# Patient Record
Sex: FEMALE | URBAN - METROPOLITAN AREA
[De-identification: names, ages, dates, MRNs, and addresses within clinical notes are randomized per-mention and may not be internally consistent; named-entity substitution may affect disease eponyms.]

---

## 2017-04-06 ENCOUNTER — INPATIENT (INPATIENT)
Facility: HOSPITAL | Age: 52
LOS: 2 days | Discharge: HOME | End: 2017-04-09
Attending: INTERNAL MEDICINE | Admitting: INTERNAL MEDICINE
Payer: MEDICAID

## 2017-04-06 VITALS
DIASTOLIC BLOOD PRESSURE: 102 MMHG | OXYGEN SATURATION: 100 % | HEART RATE: 90 BPM | SYSTOLIC BLOOD PRESSURE: 178 MMHG | RESPIRATION RATE: 25 BRPM | TEMPERATURE: 98 F

## 2017-04-06 DIAGNOSIS — Z98.89 OTHER SPECIFIED POSTPROCEDURAL STATES: Chronic | ICD-10-CM

## 2017-04-06 NOTE — ED ADULT TRIAGE NOTE - CHIEF COMPLAINT QUOTE
Pt c/o SOB that began today. Comes on intermittently and pt becomes tachypneic and feels palpitations. Denies SOB. Pt is hyperventilating and crying in triage. Denies any PMH. Pt c/o SOB that began today. Comes on intermittently and pt becomes tachypneic and feels palpitations. Denies SOB. Pt is hyperventilating and crying in triage. Pt is speaking in full sentences with intermittent episodes of tachypnea. O2 sat 100% on RA. Denies any PMH.

## 2017-04-07 DIAGNOSIS — R55 SYNCOPE AND COLLAPSE: ICD-10-CM

## 2017-04-07 DIAGNOSIS — R09.81 NASAL CONGESTION: ICD-10-CM

## 2017-04-07 DIAGNOSIS — R06.02 SHORTNESS OF BREATH: ICD-10-CM

## 2017-04-07 LAB
ALBUMIN SERPL ELPH-MCNC: 4.3 G/DL — SIGNIFICANT CHANGE UP (ref 3.3–5)
ALP SERPL-CCNC: 48 U/L — SIGNIFICANT CHANGE UP (ref 40–120)
ALT FLD-CCNC: 15 U/L — SIGNIFICANT CHANGE UP (ref 4–33)
APPEARANCE UR: CLEAR — SIGNIFICANT CHANGE UP
APTT BLD: 30.1 SEC — SIGNIFICANT CHANGE UP (ref 27.5–37.4)
AST SERPL-CCNC: 21 U/L — SIGNIFICANT CHANGE UP (ref 4–32)
BASOPHILS # BLD AUTO: 0.02 K/UL — SIGNIFICANT CHANGE UP (ref 0–0.2)
BASOPHILS NFR BLD AUTO: 0.3 % — SIGNIFICANT CHANGE UP (ref 0–2)
BILIRUB SERPL-MCNC: 0.3 MG/DL — SIGNIFICANT CHANGE UP (ref 0.2–1.2)
BILIRUB UR-MCNC: NEGATIVE — SIGNIFICANT CHANGE UP
BLOOD UR QL VISUAL: NEGATIVE — SIGNIFICANT CHANGE UP
BUN SERPL-MCNC: 18 MG/DL — SIGNIFICANT CHANGE UP (ref 7–23)
CALCIUM SERPL-MCNC: 9.4 MG/DL — SIGNIFICANT CHANGE UP (ref 8.4–10.5)
CHLORIDE SERPL-SCNC: 102 MMOL/L — SIGNIFICANT CHANGE UP (ref 98–107)
CK MB BLD-MCNC: 1 NG/ML — SIGNIFICANT CHANGE UP (ref 1–4.7)
CK MB BLD-MCNC: 1.02 NG/ML — SIGNIFICANT CHANGE UP (ref 1–4.7)
CK MB BLD-MCNC: SIGNIFICANT CHANGE UP (ref 0–2.5)
CK SERPL-CCNC: 61 U/L — SIGNIFICANT CHANGE UP (ref 25–170)
CK SERPL-CCNC: 78 U/L — SIGNIFICANT CHANGE UP (ref 25–170)
CO2 SERPL-SCNC: 21 MMOL/L — LOW (ref 22–31)
COLOR SPEC: SIGNIFICANT CHANGE UP
CREAT SERPL-MCNC: 0.84 MG/DL — SIGNIFICANT CHANGE UP (ref 0.5–1.3)
EOSINOPHIL # BLD AUTO: 0.07 K/UL — SIGNIFICANT CHANGE UP (ref 0–0.5)
EOSINOPHIL NFR BLD AUTO: 1.2 % — SIGNIFICANT CHANGE UP (ref 0–6)
GLUCOSE SERPL-MCNC: 111 MG/DL — HIGH (ref 70–99)
GLUCOSE UR-MCNC: NEGATIVE — SIGNIFICANT CHANGE UP
HBA1C BLD-MCNC: 5.4 % — SIGNIFICANT CHANGE UP (ref 4–5.6)
HCG UR-SCNC: NEGATIVE — SIGNIFICANT CHANGE UP
HCT VFR BLD CALC: 34.3 % — LOW (ref 34.5–45)
HGB BLD-MCNC: 11.5 G/DL — SIGNIFICANT CHANGE UP (ref 11.5–15.5)
IMM GRANULOCYTES NFR BLD AUTO: 0.2 % — SIGNIFICANT CHANGE UP (ref 0–1.5)
INR BLD: 1.04 — SIGNIFICANT CHANGE UP (ref 0.88–1.17)
KETONES UR-MCNC: SIGNIFICANT CHANGE UP
LEUKOCYTE ESTERASE UR-ACNC: NEGATIVE — SIGNIFICANT CHANGE UP
LYMPHOCYTES # BLD AUTO: 2.03 K/UL — SIGNIFICANT CHANGE UP (ref 1–3.3)
LYMPHOCYTES # BLD AUTO: 34.3 % — SIGNIFICANT CHANGE UP (ref 13–44)
MAGNESIUM SERPL-MCNC: 1.9 MG/DL — SIGNIFICANT CHANGE UP (ref 1.6–2.6)
MCHC RBC-ENTMCNC: 27.5 PG — SIGNIFICANT CHANGE UP (ref 27–34)
MCHC RBC-ENTMCNC: 33.5 % — SIGNIFICANT CHANGE UP (ref 32–36)
MCV RBC AUTO: 82.1 FL — SIGNIFICANT CHANGE UP (ref 80–100)
MONOCYTES # BLD AUTO: 0.6 K/UL — SIGNIFICANT CHANGE UP (ref 0–0.9)
MONOCYTES NFR BLD AUTO: 10.1 % — SIGNIFICANT CHANGE UP (ref 2–14)
MUCOUS THREADS # UR AUTO: SIGNIFICANT CHANGE UP
NEUTROPHILS # BLD AUTO: 3.19 K/UL — SIGNIFICANT CHANGE UP (ref 1.8–7.4)
NEUTROPHILS NFR BLD AUTO: 53.9 % — SIGNIFICANT CHANGE UP (ref 43–77)
NITRITE UR-MCNC: NEGATIVE — SIGNIFICANT CHANGE UP
NT-PROBNP SERPL-SCNC: 367.2 PG/ML — SIGNIFICANT CHANGE UP
PH UR: 7 — SIGNIFICANT CHANGE UP (ref 4.6–8)
PLATELET # BLD AUTO: 282 K/UL — SIGNIFICANT CHANGE UP (ref 150–400)
PMV BLD: 10.1 FL — SIGNIFICANT CHANGE UP (ref 7–13)
POTASSIUM SERPL-MCNC: 3.9 MMOL/L — SIGNIFICANT CHANGE UP (ref 3.5–5.3)
POTASSIUM SERPL-SCNC: 3.9 MMOL/L — SIGNIFICANT CHANGE UP (ref 3.5–5.3)
PROT SERPL-MCNC: 7.1 G/DL — SIGNIFICANT CHANGE UP (ref 6–8.3)
PROT UR-MCNC: NEGATIVE — SIGNIFICANT CHANGE UP
PROTHROM AB SERPL-ACNC: 11.7 SEC — SIGNIFICANT CHANGE UP (ref 9.8–13.1)
RBC # BLD: 4.18 M/UL — SIGNIFICANT CHANGE UP (ref 3.8–5.2)
RBC # FLD: 13.2 % — SIGNIFICANT CHANGE UP (ref 10.3–14.5)
RBC CASTS # UR COMP ASSIST: SIGNIFICANT CHANGE UP (ref 0–?)
SODIUM SERPL-SCNC: 141 MMOL/L — SIGNIFICANT CHANGE UP (ref 135–145)
SP GR SPEC: 1.01 — SIGNIFICANT CHANGE UP (ref 1–1.03)
SP GR UR: 1.01 — SIGNIFICANT CHANGE UP (ref 1–1.03)
SQUAMOUS # UR AUTO: SIGNIFICANT CHANGE UP
TROPONIN T SERPL-MCNC: < 0.06 NG/ML — SIGNIFICANT CHANGE UP (ref 0–0.06)
TROPONIN T SERPL-MCNC: < 0.06 NG/ML — SIGNIFICANT CHANGE UP (ref 0–0.06)
TSH SERPL-MCNC: 2.39 UIU/ML — SIGNIFICANT CHANGE UP (ref 0.27–4.2)
UROBILINOGEN FLD QL: NORMAL E.U. — SIGNIFICANT CHANGE UP (ref 0.1–0.2)
WBC # BLD: 5.92 K/UL — SIGNIFICANT CHANGE UP (ref 3.8–10.5)
WBC # FLD AUTO: 5.92 K/UL — SIGNIFICANT CHANGE UP (ref 3.8–10.5)
WBC UR QL: SIGNIFICANT CHANGE UP (ref 0–?)

## 2017-04-07 PROCEDURE — 70486 CT MAXILLOFACIAL W/O DYE: CPT | Mod: 26

## 2017-04-07 PROCEDURE — 71020: CPT | Mod: 26

## 2017-04-07 PROCEDURE — 70450 CT HEAD/BRAIN W/O DYE: CPT | Mod: 26

## 2017-04-07 RX ORDER — FLUTICASONE PROPIONATE 50 MCG
1 SPRAY, SUSPENSION NASAL
Qty: 0 | Refills: 0 | COMMUNITY

## 2017-04-07 RX ORDER — ENOXAPARIN SODIUM 100 MG/ML
40 INJECTION SUBCUTANEOUS EVERY 24 HOURS
Qty: 0 | Refills: 0 | Status: DISCONTINUED | OUTPATIENT
Start: 2017-04-07 | End: 2017-04-09

## 2017-04-07 RX ORDER — SODIUM CHLORIDE 9 MG/ML
3 INJECTION INTRAMUSCULAR; INTRAVENOUS; SUBCUTANEOUS EVERY 8 HOURS
Qty: 0 | Refills: 0 | Status: DISCONTINUED | OUTPATIENT
Start: 2017-04-07 | End: 2017-04-09

## 2017-04-07 RX ORDER — FLUTICASONE PROPIONATE 50 MCG
1 SPRAY, SUSPENSION NASAL DAILY
Qty: 0 | Refills: 0 | Status: DISCONTINUED | OUTPATIENT
Start: 2017-04-07 | End: 2017-04-09

## 2017-04-07 RX ORDER — ACETAMINOPHEN 500 MG
650 TABLET ORAL EVERY 6 HOURS
Qty: 0 | Refills: 0 | Status: DISCONTINUED | OUTPATIENT
Start: 2017-04-07 | End: 2017-04-09

## 2017-04-07 RX ORDER — ASPIRIN/CALCIUM CARB/MAGNESIUM 324 MG
162 TABLET ORAL ONCE
Qty: 0 | Refills: 0 | Status: COMPLETED | OUTPATIENT
Start: 2017-04-07 | End: 2017-04-07

## 2017-04-07 RX ADMIN — Medication 650 MILLIGRAM(S): at 17:30

## 2017-04-07 RX ADMIN — SODIUM CHLORIDE 3 MILLILITER(S): 9 INJECTION INTRAMUSCULAR; INTRAVENOUS; SUBCUTANEOUS at 22:11

## 2017-04-07 RX ADMIN — SODIUM CHLORIDE 3 MILLILITER(S): 9 INJECTION INTRAMUSCULAR; INTRAVENOUS; SUBCUTANEOUS at 17:28

## 2017-04-07 RX ADMIN — ENOXAPARIN SODIUM 40 MILLIGRAM(S): 100 INJECTION SUBCUTANEOUS at 17:29

## 2017-04-07 RX ADMIN — Medication 162 MILLIGRAM(S): at 03:37

## 2017-04-07 RX ADMIN — Medication 650 MILLIGRAM(S): at 18:00

## 2017-04-07 NOTE — H&P ADULT. - NEGATIVE OPHTHALMOLOGIC SYMPTOMS
no loss of vision L/no loss of vision R/no blurred vision L/no photophobia/no blurred vision R/no diplopia

## 2017-04-07 NOTE — H&P ADULT. - FAMILY HISTORY
Father  Still living? Yes, Estimated age: Age Unknown  Family history of hypertension, Age at diagnosis: Age Unknown     Mother  Still living? Yes, Estimated age: Age Unknown  Family history of hypertension, Age at diagnosis: Age Unknown

## 2017-04-07 NOTE — ED PROVIDER NOTE - MEDICAL DECISION MAKING DETAILS
pt with likely a panic attack with abnml ekg, unlikely cardiac based on no CP or diaphoresis, non exertional. No PE risk factors. Will check cxr, cardiacs x 2

## 2017-04-07 NOTE — ED ADULT NURSE NOTE - OBJECTIVE STATEMENT
Pt presents to ED R#15 AOx4, in NAD, c/o episode of SOB with B/L arm parasthesias. pt states she believed she had panic attack, currently denies SOB, but states "I don't feel like I can take full breath of air." Respirations appear even and unlabored at present time, O2 sat 100% on RA,  Pt reports taking steroids today for possible sinus infection, has been taking afrin nasal spray x1 month for nasal congestion. Denies fevers/ chills. Denies N/ V/ D/ CP/ urinary changes. Dr Haji at bedside, VSS, will continue to monitor.

## 2017-04-07 NOTE — ED PROVIDER NOTE - PROGRESS NOTE DETAILS
K[place]: Currently asymptomatic. Repeat EKG wnl. Given initial STD w/ SOB and now normal EKG w/o symptoms, though story s very atypical, higher concern for ACS. Admitting for further cardiac w/u. Updated pt, Has no cardiologist. d/w dr españa, text paged tele pa.

## 2017-04-07 NOTE — H&P ADULT. - ASSESSMENT
52 y/o female, with a PmHx of Appendectomy, is being admitted to telemetry for sob and near syncope.

## 2017-04-07 NOTE — ED PROVIDER NOTE - OBJECTIVE STATEMENT
52yo f w/o pmh p/w shortness of breath. Pt with nasal congestion for past few weeks, thought she had a sinus infection, used afrin multiple times a day for past two weeks. Pmd started Flonase today. Pt used flonase 6x since 6pm and started having SOB and anxiety starting around 9pm. Symptoms accompanied by tingling in hand. Symptoms now improved. Denies CP or palpitations during this period. No known hx of panic attacks. +nasal congestion. No cough, fevers, chills, abdominal pain, urinary symptoms. Denies recent plane rides, estrogen use, hx of blood clots, cancer hx. 50yo f w/o pmh p/w shortness of breath. Pt with nasal congestion for past few weeks, thought she had a sinus infection, used afrin multiple times a day for past two weeks. Pmd started Flonase today. Pt used flonase 6x since 6pm and started having SOB and anxiety starting around 9pm. Symptoms accompanied by tingling in hand. Symptoms now improved. Denies CP or palpitations during this period. No known hx of panic attacks. +nasal congestion. No cough, fevers, chills, abdominal pain, urinary symptoms. Denies recent plane rides, estrogen use, hx of blood clots, cancer hx, LE pain/swelling. No FMH CAD/clots. No prior cardiac w/u.

## 2017-04-07 NOTE — H&P ADULT. - ATTENDING COMMENTS
Pt seen and examined agree with plana above   EKG - NSR     A/P     1) Pres syncope - check orthostatics, CT head, 2d echo, likely vasovagal    2) Headache - h/o migranes will get CT head     3) DVT - sc lovenox

## 2017-04-07 NOTE — ED ADULT NURSE NOTE - CHIEF COMPLAINT QUOTE
Pt c/o SOB that began today. Comes on intermittently and pt becomes tachypneic and feels palpitations. Denies SOB. Pt is hyperventilating and crying in triage. Pt is speaking in full sentences with intermittent episodes of tachypnea. O2 sat 100% on RA. Denies any PMH.

## 2017-04-07 NOTE — H&P ADULT. - RS GEN PE MLT RESP DETAILS PC
no chest wall tenderness/good air movement/clear to auscultation bilaterally/respirations non-labored/breath sounds equal/airway patent

## 2017-04-07 NOTE — H&P ADULT. - NEGATIVE CARDIOVASCULAR SYMPTOMS
no chest pain/no palpitations/no dyspnea on exertion/no orthopnea/no peripheral edema/no paroxysmal nocturnal dyspnea

## 2017-04-07 NOTE — H&P ADULT. - HISTORY OF PRESENT ILLNESS
52 y/o female, with a PmHx of Appendectomy, presented to [facility] ED c/o sob and nasal congestion. Pt states for the past 2 weeks she has been having a lot of nasal congestion and thought she had a sinus infection so she has been using Afrin multiple times a day to try and relieve the symptoms. Then yesterday morning at around 0700hrs she woke up from sleep with a severe HA and stayed in bed for most of the day until she was able to go to her PMD's office and was told she might have a polyp in her left nostril and was prescribed Flonase. She states she was feeling better after that but later that evening at around 2100hrs she states the HA came back and she was feeling congested again so she took several more sprays of the Flonase/Afrin to see if that would help. A few minutes later she states she became very dizzy and felt like she was going to pass out so she came to the ED for an evaluation. She had associated symptoms of "shaking", dizziness, emesis x 3 and felt warm (but never took her temperature). Denies chest pain, blurred vision, sick contacts, recent travel, abdominal pain. She states she feels a little better now and was admitted to telemetry for near syncope.

## 2017-04-07 NOTE — ED PROVIDER NOTE - NS ED MD EKG INTERPRETATION 1
normal sinus rhythm, Normal axis, Normal RI interval and QRS complex. There are no acute ischemic ST or T-wave changes.

## 2017-04-07 NOTE — ED PROVIDER NOTE - ATTENDING CONTRIBUTION TO CARE
51F no PMH p/w unable to breath through nose x2w (pmd referred to ED). Vitals, exam wnl. EKG abnormal w/ no old.   ddx: Likely anxiety related. Clinically not PE, dissection, tamponade, PTX, myocarditis, perf. Would be very atypical for ACS. HEART score low - only abnormal EKG.   CBC, cmp, CE, CXR. Repeat CE/EKG.

## 2017-04-08 LAB
BUN SERPL-MCNC: 13 MG/DL — SIGNIFICANT CHANGE UP (ref 7–23)
CALCIUM SERPL-MCNC: 9.1 MG/DL — SIGNIFICANT CHANGE UP (ref 8.4–10.5)
CHLORIDE SERPL-SCNC: 105 MMOL/L — SIGNIFICANT CHANGE UP (ref 98–107)
CHOLEST SERPL-MCNC: 235 MG/DL — HIGH (ref 120–199)
CO2 SERPL-SCNC: 21 MMOL/L — LOW (ref 22–31)
CREAT SERPL-MCNC: 0.71 MG/DL — SIGNIFICANT CHANGE UP (ref 0.5–1.3)
GLUCOSE SERPL-MCNC: 85 MG/DL — SIGNIFICANT CHANGE UP (ref 70–99)
HCT VFR BLD CALC: 33 % — LOW (ref 34.5–45)
HDLC SERPL-MCNC: 50 MG/DL — SIGNIFICANT CHANGE UP (ref 45–65)
HGB BLD-MCNC: 11 G/DL — LOW (ref 11.5–15.5)
LIPID PNL WITH DIRECT LDL SERPL: 169 MG/DL — SIGNIFICANT CHANGE UP
MCHC RBC-ENTMCNC: 27.6 PG — SIGNIFICANT CHANGE UP (ref 27–34)
MCHC RBC-ENTMCNC: 33.3 % — SIGNIFICANT CHANGE UP (ref 32–36)
MCV RBC AUTO: 82.7 FL — SIGNIFICANT CHANGE UP (ref 80–100)
PLATELET # BLD AUTO: 252 K/UL — SIGNIFICANT CHANGE UP (ref 150–400)
PMV BLD: 10.2 FL — SIGNIFICANT CHANGE UP (ref 7–13)
POTASSIUM SERPL-MCNC: 3.5 MMOL/L — SIGNIFICANT CHANGE UP (ref 3.5–5.3)
POTASSIUM SERPL-SCNC: 3.5 MMOL/L — SIGNIFICANT CHANGE UP (ref 3.5–5.3)
RBC # BLD: 3.99 M/UL — SIGNIFICANT CHANGE UP (ref 3.8–5.2)
RBC # FLD: 13.6 % — SIGNIFICANT CHANGE UP (ref 10.3–14.5)
SODIUM SERPL-SCNC: 142 MMOL/L — SIGNIFICANT CHANGE UP (ref 135–145)
TRIGL SERPL-MCNC: 93 MG/DL — SIGNIFICANT CHANGE UP (ref 10–149)
WBC # BLD: 4.48 K/UL — SIGNIFICANT CHANGE UP (ref 3.8–10.5)
WBC # FLD AUTO: 4.48 K/UL — SIGNIFICANT CHANGE UP (ref 3.8–10.5)

## 2017-04-08 PROCEDURE — 93306 TTE W/DOPPLER COMPLETE: CPT | Mod: 26

## 2017-04-08 RX ADMIN — Medication 1 SPRAY(S): at 12:32

## 2017-04-08 RX ADMIN — Medication 650 MILLIGRAM(S): at 15:45

## 2017-04-08 RX ADMIN — SODIUM CHLORIDE 3 MILLILITER(S): 9 INJECTION INTRAMUSCULAR; INTRAVENOUS; SUBCUTANEOUS at 21:30

## 2017-04-08 RX ADMIN — Medication 650 MILLIGRAM(S): at 15:15

## 2017-04-08 RX ADMIN — SODIUM CHLORIDE 3 MILLILITER(S): 9 INJECTION INTRAMUSCULAR; INTRAVENOUS; SUBCUTANEOUS at 14:48

## 2017-04-08 RX ADMIN — SODIUM CHLORIDE 3 MILLILITER(S): 9 INJECTION INTRAMUSCULAR; INTRAVENOUS; SUBCUTANEOUS at 06:56

## 2017-04-08 RX ADMIN — ENOXAPARIN SODIUM 40 MILLIGRAM(S): 100 INJECTION SUBCUTANEOUS at 17:30

## 2017-04-09 ENCOUNTER — TRANSCRIPTION ENCOUNTER (OUTPATIENT)
Age: 52
End: 2017-04-09

## 2017-04-09 VITALS
SYSTOLIC BLOOD PRESSURE: 127 MMHG | DIASTOLIC BLOOD PRESSURE: 83 MMHG | OXYGEN SATURATION: 99 % | HEART RATE: 68 BPM | RESPIRATION RATE: 18 BRPM | TEMPERATURE: 99 F

## 2017-04-09 LAB
BASOPHILS # BLD AUTO: 0.02 K/UL — SIGNIFICANT CHANGE UP (ref 0–0.2)
BASOPHILS NFR BLD AUTO: 0.5 % — SIGNIFICANT CHANGE UP (ref 0–2)
BUN SERPL-MCNC: 14 MG/DL — SIGNIFICANT CHANGE UP (ref 7–23)
CALCIUM SERPL-MCNC: 9.2 MG/DL — SIGNIFICANT CHANGE UP (ref 8.4–10.5)
CHLORIDE SERPL-SCNC: 104 MMOL/L — SIGNIFICANT CHANGE UP (ref 98–107)
CO2 SERPL-SCNC: 23 MMOL/L — SIGNIFICANT CHANGE UP (ref 22–31)
CREAT SERPL-MCNC: 0.74 MG/DL — SIGNIFICANT CHANGE UP (ref 0.5–1.3)
EOSINOPHIL # BLD AUTO: 0.13 K/UL — SIGNIFICANT CHANGE UP (ref 0–0.5)
EOSINOPHIL NFR BLD AUTO: 3.4 % — SIGNIFICANT CHANGE UP (ref 0–6)
GLUCOSE SERPL-MCNC: 96 MG/DL — SIGNIFICANT CHANGE UP (ref 70–99)
HCT VFR BLD CALC: 33.3 % — LOW (ref 34.5–45)
HGB BLD-MCNC: 11.2 G/DL — LOW (ref 11.5–15.5)
IMM GRANULOCYTES NFR BLD AUTO: 0 % — SIGNIFICANT CHANGE UP (ref 0–1.5)
LYMPHOCYTES # BLD AUTO: 1.42 K/UL — SIGNIFICANT CHANGE UP (ref 1–3.3)
LYMPHOCYTES # BLD AUTO: 37.1 % — SIGNIFICANT CHANGE UP (ref 13–44)
MAGNESIUM SERPL-MCNC: 1.9 MG/DL — SIGNIFICANT CHANGE UP (ref 1.6–2.6)
MCHC RBC-ENTMCNC: 28 PG — SIGNIFICANT CHANGE UP (ref 27–34)
MCHC RBC-ENTMCNC: 33.6 % — SIGNIFICANT CHANGE UP (ref 32–36)
MCV RBC AUTO: 83.3 FL — SIGNIFICANT CHANGE UP (ref 80–100)
MONOCYTES # BLD AUTO: 0.34 K/UL — SIGNIFICANT CHANGE UP (ref 0–0.9)
MONOCYTES NFR BLD AUTO: 8.9 % — SIGNIFICANT CHANGE UP (ref 2–14)
NEUTROPHILS # BLD AUTO: 1.92 K/UL — SIGNIFICANT CHANGE UP (ref 1.8–7.4)
NEUTROPHILS NFR BLD AUTO: 50.1 % — SIGNIFICANT CHANGE UP (ref 43–77)
PLATELET # BLD AUTO: 247 K/UL — SIGNIFICANT CHANGE UP (ref 150–400)
PMV BLD: 9.9 FL — SIGNIFICANT CHANGE UP (ref 7–13)
POTASSIUM SERPL-MCNC: 3.8 MMOL/L — SIGNIFICANT CHANGE UP (ref 3.5–5.3)
POTASSIUM SERPL-SCNC: 3.8 MMOL/L — SIGNIFICANT CHANGE UP (ref 3.5–5.3)
RBC # BLD: 4 M/UL — SIGNIFICANT CHANGE UP (ref 3.8–5.2)
RBC # FLD: 13.5 % — SIGNIFICANT CHANGE UP (ref 10.3–14.5)
SODIUM SERPL-SCNC: 141 MMOL/L — SIGNIFICANT CHANGE UP (ref 135–145)
WBC # BLD: 3.83 K/UL — SIGNIFICANT CHANGE UP (ref 3.8–10.5)
WBC # FLD AUTO: 3.83 K/UL — SIGNIFICANT CHANGE UP (ref 3.8–10.5)

## 2017-04-09 RX ADMIN — SODIUM CHLORIDE 3 MILLILITER(S): 9 INJECTION INTRAMUSCULAR; INTRAVENOUS; SUBCUTANEOUS at 14:54

## 2017-04-09 RX ADMIN — SODIUM CHLORIDE 3 MILLILITER(S): 9 INJECTION INTRAMUSCULAR; INTRAVENOUS; SUBCUTANEOUS at 06:34

## 2017-04-09 NOTE — DISCHARGE NOTE ADULT - PLAN OF CARE
Prevent further Events Follow up with Dr. Oglesby in 1- 2 Follow up with Your Dentist in 1-2 weeks with Roots protruding into the maxillary sinus

## 2017-04-09 NOTE — DISCHARGE NOTE ADULT - MEDICATION SUMMARY - MEDICATIONS TO TAKE
I will START or STAY ON the medications listed below when I get home from the hospital:    Flonase 50 mcg/inh nasal spray  -- 1 spray(s) into nose once a day  -- Indication: For Congestion

## 2017-04-09 NOTE — DISCHARGE NOTE ADULT - CARE PROVIDER_API CALL
Johnnie Oglesby), Cardiovascular Disease; Internal Medicine; Nuclear Cardiology  [address] [place] [address]  Phone: [phone]  Fax: [phone]

## 2017-04-09 NOTE — DISCHARGE NOTE ADULT - CARE PLAN
Principal Discharge DX:	Near syncope  Goal:	Prevent further Events  Instructions for follow-up, activity and diet:	Follow up with Dr. Oglesby in 1- 2 Principal Discharge DX:	Near syncope  Goal:	Prevent further Events  Instructions for follow-up, activity and diet:	Follow up with Dr. Oglesby in 1- 2  Secondary Diagnosis:	Dental implant pain, sequela  Goal:	with Roots protruding into the maxillary sinus  Instructions for follow-up, activity and diet:	Follow up with Your Dentist in 1-2 weeks

## 2017-04-09 NOTE — DISCHARGE NOTE ADULT - HOSPITAL COURSE
50 y/o female, with a PmHx of Appendectomy, presented to [facility] ED c/o sob and nasal congestion. Pt states for the past 2 weeks she has been having a lot of nasal congestion and thought she had a sinus infection so she has been using Afrin multiple times a day to try and relieve the symptoms. Then yesterday morning at around 0700hrs she woke up from sleep with a severe HA and stayed in bed for most of the day until she was able to go to her PMD's office and was told she might have a polyp in her left nostril and was prescribed Flonase. She states she was feeling better after that but later that evening at around 2100hrs she states the HA came back and she was feeling congested again so she took several more sprays of the Flonase/Afrin to see if that would help. A few minutes later she states she became very dizzy and felt like she was going to pass out so she came to the ED for an evaluation. She had associated symptoms of "shaking", dizziness, emesis x 3 and felt warm (but never took her temperature). Denies chest pain, blurred vision, sick contacts, recent travel, abdominal pain. She states she feels a little better now and was admitted to telemetry for near syncope.    During Hospital Course:  orthostatics negative   EKG: NSR @ 63, 1st deg AVB, T inv V2  CE x 2 neg                    4/7 CXR - clear lungs  4/7 CT sinus - Dental implants and the roots of the left maxillary third molar protrude into the maxillary sinus alveolar recesses as described above.   Rightward bony nasal septal deviation with moderate bony spur which   deforms the right inferior turbinate.  Narrowing of sinus drainage pathways as described.  Above was discussed with Dental, finding is very Common with Dental implant, No Urgency, Pt can follow up her private Dentist as outpt  4/7 CT head - No acute intracranial hemorrhage, mass effect, or evidence of acute territorial infarct. The ventricles and sulci are normal in size.  The calvarium and skull base are intact.     4/8 TTE: EF 70%  1. Normal left ventricular internal dimensions and wall thicknesses.  2. Normal left ventricular systolic function. No segmental wall motion abnormalities.  3. Normal left ventricular diastolic function.  4. Normal right ventricular size and function.  *** No previous Echo exam.   Case discussed with attending, Pt Stable for discharge

## 2017-04-09 NOTE — DISCHARGE NOTE ADULT - PATIENT PORTAL LINK FT
“You can access the FollowHealth Patient Portal, offered by [facility] [place], by registering with the following website: [email or web address]”

## 2021-05-12 ENCOUNTER — EMERGENCY (EMERGENCY)
Facility: HOSPITAL | Age: 56
LOS: 1 days | Discharge: HOME | End: 2021-05-12
Attending: EMERGENCY MEDICINE | Admitting: EMERGENCY MEDICINE
Payer: COMMERCIAL

## 2021-05-12 VITALS
DIASTOLIC BLOOD PRESSURE: 93 MMHG | SYSTOLIC BLOOD PRESSURE: 136 MMHG | OXYGEN SATURATION: 100 % | RESPIRATION RATE: 16 BRPM | HEART RATE: 60 BPM | TEMPERATURE: 98 F

## 2021-05-12 VITALS
RESPIRATION RATE: 16 BRPM | TEMPERATURE: 98 F | SYSTOLIC BLOOD PRESSURE: 134 MMHG | OXYGEN SATURATION: 100 % | DIASTOLIC BLOOD PRESSURE: 73 MMHG | HEART RATE: 64 BPM | HEIGHT: 66 IN

## 2021-05-12 DIAGNOSIS — Z98.89 OTHER SPECIFIED POSTPROCEDURAL STATES: Chronic | ICD-10-CM

## 2021-05-12 PROCEDURE — 99284 EMERGENCY DEPT VISIT MOD MDM: CPT

## 2021-05-12 PROCEDURE — 73630 X-RAY EXAM OF FOOT: CPT | Mod: 26,RT

## 2021-05-12 PROCEDURE — 73610 X-RAY EXAM OF ANKLE: CPT | Mod: 26,RT

## 2021-05-12 RX ORDER — IBUPROFEN 200 MG
600 TABLET ORAL ONCE
Refills: 0 | Status: COMPLETED | OUTPATIENT
Start: 2021-05-12 | End: 2021-05-12

## 2021-05-12 RX ADMIN — Medication 600 MILLIGRAM(S): at 18:29

## 2021-05-12 NOTE — ED PROVIDER NOTE - PHYSICAL EXAMINATION
Gen:  Well appearing in NAD  Head:  NC/AT  Resp: No distress   Ext: no deformities  Skin: warm and dry as visualized

## 2021-05-12 NOTE — ED PROVIDER NOTE - OBJECTIVE STATEMENT
56 y/o F  CC: Right Ankle pain  Duration: 10 days  Context: Inversion Mechanism  Modify: Better with minimizing with weight bearing  Associated Symptoms: No knee, no proximal fibula pain

## 2021-05-12 NOTE — ED PROVIDER NOTE - NSFOLLOWUPCLINICS_GEN_ALL_ED_FT
[facility]  Podiatry  [address] [place] [address] - [address] [place] [address]  Phone: [phone]  Fax:   Follow Up Time: 4-6 Days

## 2021-05-12 NOTE — ED ADULT TRIAGE NOTE - CHIEF COMPLAINT QUOTE
Pt c/o R ankle pain and swelling; s/p trip/fall  May 2, ambulatory to triage. Denies head injury, use of blood thinner

## 2021-05-12 NOTE — ED PROVIDER NOTE - NSFOLLOWUPINSTRUCTIONS_ED_ALL_ED_FT
Fracture    A fracture is a break in one of your bones. This can occur from a variety of injuries, especially traumatic ones. Symptoms include pain, bruising, or swelling. Do not use the injured limb. If a fracture is in one of the bones below your waist, do not put weight on that limb unless instructed to do so by your healthcare provider. Crutches or a cane may have been provided. A splint or cast may have been applied by your health care provider. Make sure to keep it dry and follow up with an orthopedist as instructed.    SEEK IMMEDIATE MEDICAL CARE IF YOU HAVE ANY OF THE FOLLOWING SYMPTOMS: numbness, tingling, increasing pain, or weakness in any part of the injured limb.    Call podiatry clinic to arrange follow up of this injury (see below)

## 2021-05-12 NOTE — ED PROVIDER NOTE - CARE PLAN
Principal Discharge DX:	Closed nondisplaced fracture of lateral malleolus of right fibula, initial encounter

## 2021-05-12 NOTE — ED PROVIDER NOTE - PATIENT PORTAL LINK FT
You can access the FollowMyHealth Patient Portal offered by [facility] by registering at the following website: [email or web address]. By joining [employer]’s FollowMyHealth portal, you will also be able to view your health information using other applications (apps) compatible with our system.

## 2021-05-12 NOTE — ED PROVIDER NOTE - NS_ ATTENDINGSCRIBEDETAILS _ED_A_ED_FT
MD Barrett:  The scribe's documentation has been prepared under my direction and personally reviewed by me in its entirety. I confirm that the note above accurately reflects all work, treatment, procedures, and medical decision making performed by me.  MD Barrett:  see the MDM & progress notes for my I&P.

## 2022-02-01 NOTE — H&P ADULT. - VENOUS THROMBOEMBOLISM
Final Anesthesia Post-op Assessment    Patient: Cole Lazo  Procedure(s) Performed: LEFT WIRE GUIDED LUMPECTOMY  SENTINEL NODE BIOPSY,  POSSIBLE AXILLARY LYMPH NODE DISSECTION   - LEFT  Anesthesia type: General    Vitals Value Taken Time   Temp 36.1 02/01/22 1347   Pulse 87 02/01/22 1346   Resp 11 02/01/22 1346   SpO2 100 % 02/01/22 1346   /65 02/01/22 1347   Vitals shown include unvalidated device data. Patient Location: PACU Phase 1  Post-op Vital Signs:stable  Level of Consciousness: awake and alert  Respiratory Status: spontaneous ventilation and face mask  Cardiovascular stable  Hydration: euvolemic  Pain Management: adequately controlled  Handoff: Handoff to receiving nurse was performed and questions were answered  Vomiting: none  Nausea: None  Airway Patency:patent  Post-op Assessment: no complications and patient tolerated procedure well with no complications      No complications documented.
no

## 2022-08-18 ENCOUNTER — APPOINTMENT (OUTPATIENT)
Dept: ORTHOPEDIC SURGERY | Facility: CLINIC | Age: 57
End: 2022-08-18

## 2022-10-31 ENCOUNTER — APPOINTMENT (OUTPATIENT)
Dept: OBGYN | Facility: CLINIC | Age: 57
End: 2022-10-31

## 2022-10-31 VITALS
BODY MASS INDEX: 24.91 KG/M2 | DIASTOLIC BLOOD PRESSURE: 78 MMHG | HEIGHT: 66 IN | SYSTOLIC BLOOD PRESSURE: 122 MMHG | WEIGHT: 155 LBS

## 2022-10-31 DIAGNOSIS — Z01.419 ENCOUNTER FOR GYNECOLOGICAL EXAMINATION (GENERAL) (ROUTINE) W/OUT ABNORMAL FINDINGS: ICD-10-CM

## 2022-10-31 PROCEDURE — 99203 OFFICE O/P NEW LOW 30 MIN: CPT | Mod: 25

## 2022-10-31 PROCEDURE — 99386 PREV VISIT NEW AGE 40-64: CPT

## 2022-10-31 NOTE — PHYSICAL EXAM
[Chaperone Present] : A chaperone was present in the examining room during all aspects of the physical examination [Appropriately responsive] : appropriately responsive [Alert] : alert [No Acute Distress] : no acute distress [Soft] : soft [Non-tender] : non-tender [Non-distended] : non-distended [Oriented x3] : oriented x3 [Examination Of The Breasts] : a normal appearance [No Masses] : no breast masses were palpable [Labia Majora] : normal [Labia Minora] : normal [Normal] : normal [Uterine Adnexae] : normal [FreeTextEntry1] : Caryl Carbajal [FreeTextEntry4] : Minimal brown discharge in the vagina

## 2022-10-31 NOTE — DISCUSSION/SUMMARY
[FreeTextEntry1] : Exam as above.\par Pap done.\par Noted brown discharge in the vagina that the patient was not aware of.\par Discussed with the patient postmenopausal bleeding.\par Discussed etiologies including, but not limited to polyps, endometrial atrophy, hyperplasia and malignancy.\par Discussed that the first step would be to get a pelvic ultrasound to assess the endometrial cavity.\par Discussed that a biopsy will likely be warranted, but depending on the ultrasound results, decision on how to perform the biopsy will be done.\par Referral for colonoscopy and mammogram given.\par Referral for pelvic ultrasound given.\par Follow-up in 2 weeks with results.

## 2022-10-31 NOTE — HISTORY OF PRESENT ILLNESS
[FreeTextEntry1] : Ayesha is a 56-year-old coming in for an annual exam.\par LMP 9/3/2022, prior to that reports no menses for a year prior to this menses. \par FMP age 17\par Last PAP  - does not remember when, normal, no history of abnormal Pap.\par Sexually active\par No history of STDs.\par P1, NSVDX1\par Mammogram -normal 5 years ago\par PMH -HTN, back pain\par PSH -appendectomy breast biopsy–benign\par Medications -enalapril HCTZ\par Allergies -NKDA\par No smoking or drug use, ETOH–2/week\par Family -maternal grandmother–colon cancer at age 78. The patient never had a colonoscopy. \par

## 2022-11-01 ENCOUNTER — APPOINTMENT (OUTPATIENT)
Dept: ULTRASOUND IMAGING | Facility: CLINIC | Age: 57
End: 2022-11-01

## 2022-11-01 ENCOUNTER — OUTPATIENT (OUTPATIENT)
Dept: OUTPATIENT SERVICES | Facility: HOSPITAL | Age: 57
LOS: 1 days | End: 2022-11-01
Payer: COMMERCIAL

## 2022-11-01 DIAGNOSIS — N95.0 POSTMENOPAUSAL BLEEDING: ICD-10-CM

## 2022-11-01 DIAGNOSIS — Z98.89 OTHER SPECIFIED POSTPROCEDURAL STATES: Chronic | ICD-10-CM

## 2022-11-01 PROCEDURE — 76856 US EXAM PELVIC COMPLETE: CPT

## 2022-11-01 PROCEDURE — 76830 TRANSVAGINAL US NON-OB: CPT | Mod: 26

## 2022-11-01 PROCEDURE — 76830 TRANSVAGINAL US NON-OB: CPT

## 2022-11-01 PROCEDURE — 76856 US EXAM PELVIC COMPLETE: CPT | Mod: 26,59

## 2022-11-02 LAB — HPV HIGH+LOW RISK DNA PNL CVX: NOT DETECTED

## 2022-11-14 ENCOUNTER — APPOINTMENT (OUTPATIENT)
Dept: OBGYN | Facility: CLINIC | Age: 57
End: 2022-11-14

## 2022-11-14 LAB — CYTOLOGY CVX/VAG DOC THIN PREP: NORMAL

## 2022-11-14 PROCEDURE — 99214 OFFICE O/P EST MOD 30 MIN: CPT | Mod: 95

## 2022-11-14 NOTE — HISTORY OF PRESENT ILLNESS
[Home] : at home, [unfilled] , at the time of the visit. [Medical Office: ([facility])___] : at the medical office located in  [Verbal consent obtained from patient] : the patient, [unfilled] [FreeTextEntry1] : Yessenia has a telehealth to review her ultrasound results and discuss management options.\par She initially presented with postmenopausal bleeding.\par She had an ultrasound done showing a uterus of 9 x 4.7 x 4.4 cm.  Endometrium measuring 5 mm.  Right ovary normal.  Left ovary normal, simple cyst measuring 14 mm which requires no further evaluation.  No other findings.

## 2022-11-14 NOTE — DISCUSSION/SUMMARY
[FreeTextEntry1] : I reviewed the images of the ultrasound myself.  The endometrium appears to me to be between 5 and 6 mm.\par I reviewed the ultrasound results with the patient.  We discussed that normal endometrial lining should be up to 4 mm in menopause.  I discussed with her the recommendation for further work-up including:\par 1.  Office EMB\par 2.  Office hysteroscopy with EMB\par 3.  OR hysteroscopy with EMB.\par I discussed with her that given that the endometrium is not very heterogeneous and not very thickened, the office options are sufficient in my opinion.  I discussed with her that an endometrial biopsy alone will obtain about 10% of the endometrium.  Discussed that when combining it with a hysteroscopy to assess visually the endometrial cavity, I can assess for any abnormalities.  Discussed that if I see a polyp that is small enough I can try to remove it.  Discussed that if a large polyp or other significant abnormalities are noted we can always stop and decide to proceed with operating room hysteroscopy D&C.\par The patient opted for hysteroscopy in the office with endometrial biopsy.  Discussed taking 800 mg of Motrin 1 hour prior to the procedure.\par The patient will schedule the procedure.\par All her questions were answered.

## 2023-02-27 ENCOUNTER — NON-APPOINTMENT (OUTPATIENT)
Age: 58
End: 2023-02-27

## 2023-02-27 ENCOUNTER — APPOINTMENT (OUTPATIENT)
Dept: OBGYN | Facility: CLINIC | Age: 58
End: 2023-02-27

## 2023-04-21 PROBLEM — Z87.42 HISTORY OF POSTMENOPAUSAL BLEEDING: Status: RESOLVED | Noted: 2022-10-31 | Resolved: 2023-04-21

## 2023-04-24 ENCOUNTER — APPOINTMENT (OUTPATIENT)
Dept: OBGYN | Facility: CLINIC | Age: 58
End: 2023-04-24
Payer: COMMERCIAL

## 2023-04-24 VITALS
HEIGHT: 66 IN | BODY MASS INDEX: 24.91 KG/M2 | WEIGHT: 155 LBS | SYSTOLIC BLOOD PRESSURE: 124 MMHG | DIASTOLIC BLOOD PRESSURE: 76 MMHG

## 2023-04-24 DIAGNOSIS — R31.9 HEMATURIA, UNSPECIFIED: ICD-10-CM

## 2023-04-24 DIAGNOSIS — R10.2 PELVIC AND PERINEAL PAIN: ICD-10-CM

## 2023-04-24 DIAGNOSIS — N95.0 POSTMENOPAUSAL BLEEDING: ICD-10-CM

## 2023-04-24 DIAGNOSIS — Z87.42 PERSONAL HISTORY OF OTHER DISEASES OF THE FEMALE GENITAL TRACT: ICD-10-CM

## 2023-04-24 LAB
BILIRUB UR QL STRIP: NEGATIVE
CLARITY UR: CLEAR
COLLECTION METHOD: NORMAL
GLUCOSE UR-MCNC: NEGATIVE
HCG UR QL: 0.2 EU/DL
HGB UR QL STRIP.AUTO: NORMAL
KETONES UR-MCNC: NEGATIVE
LEUKOCYTE ESTERASE UR QL STRIP: NEGATIVE
NITRITE UR QL STRIP: NEGATIVE
PH UR STRIP: 5.5
PROT UR STRIP-MCNC: NEGATIVE
SP GR UR STRIP: 1.03

## 2023-04-24 PROCEDURE — 58558Z: CUSTOM | Mod: 53

## 2023-04-24 PROCEDURE — 99213 OFFICE O/P EST LOW 20 MIN: CPT | Mod: 25

## 2023-04-24 NOTE — DISCUSSION/SUMMARY
[FreeTextEntry1] :  I reviewed with the patient the pain that she has.  I discussed with her work-up with GI including colonoscopy which she never had, as her pain is bilateral and less likely to be from GYN origin.  Pelvic ultrasound several months ago showed only a small 1.4 cm ovarian simple cyst.\par Referral for colonoscopy and for GI given.\par Urine was taken and had blood in it.  It was sent for culture.  If culture is negative, I would recommend seeing a urologist.\par Hysteroscopy failed as the patient could not tolerate the procedure.\par Upon further discussion the patient reports that she had a period last summer, though when I originally saw her in October she informed me that the period that she had was a year prior to her visit and episode of bleeding.  Given the significant amount of discomfort that she was with the attempted hysteroscopy we discussed:\par 1.  OR hysteroscopy with  D&C\par 2.  Repeating the ultrasound to assess the endometrium.\par The patient opted repeat ultrasound.  Referral was given to the patient.\par She will follow-up with results

## 2023-04-24 NOTE — PHYSICAL EXAM
[Chaperone Present] : A chaperone was present in the examining room during all aspects of the physical examination [FreeTextEntry1] :  Caryl Carbajal [Appropriately responsive] : appropriately responsive [Alert] : alert [No Acute Distress] : no acute distress [Oriented x3] : oriented x3 [Labia Majora] : normal [Labia Minora] : normal [Normal] : normal

## 2023-04-24 NOTE — PROCEDURE
[Hysteroscopy] : Hysteroscopy [Time out performed] : Pre-procedure time out performed.  Patient's name, date of birth and procedure confirmed. [Consent Obtained] : Consent obtained [Postmenopausal bleeding] : postmenopausal bleeding [Risks] : risks [Benefits] : benefits [Alternatives] : alternatives [Patient] : patient [Infection] : infection [Bleeding] : bleeding [flexible] : Using aseptic technique a hysteroscopy was performed using a flexible hysteroscope [Allergic Reaction] : allergic reaction [Sent to Pathology] : specimen not sent for pathology [Antibiotics given] : antibiotics not given [de-identified] : Failed hysteroscopy with EMB due to inability of patient to tolerate the procedure.

## 2023-04-24 NOTE — HISTORY OF PRESENT ILLNESS
[FreeTextEntry1] : Yessenia came in for several reasons:\par 1. Hysteroscopy for assessment of endometrial cavity. At the time of her initial visit she had bleeding after which she described as a full year without menstruation.  Ultrasound showed endometrial lining of 5 to 6 mm.\par 2.  She reports bilateral pelvic pain that is intermittent but can become very severe.  She reports that the pain is almost at the junction of the groin and the leg.  She has no GI complaints.

## 2023-05-01 LAB
APPEARANCE: CLEAR
BACTERIA UR CULT: NORMAL
BACTERIA: ABNORMAL /HPF
BILIRUBIN URINE: NEGATIVE
BLOOD URINE: ABNORMAL
CAST: 0 /LPF
COLOR: YELLOW
EPITHELIAL CELLS: 4 /HPF
GLUCOSE QUALITATIVE U: NEGATIVE MG/DL
KETONES URINE: NEGATIVE MG/DL
LEUKOCYTE ESTERASE URINE: NEGATIVE
MICROSCOPIC-UA: NORMAL
NITRITE URINE: NEGATIVE
PH URINE: 5.5
PROTEIN URINE: NEGATIVE MG/DL
RED BLOOD CELLS URINE: 13 /HPF
SPECIFIC GRAVITY URINE: 1.03
UROBILINOGEN URINE: 0.2 MG/DL
WHITE BLOOD CELLS URINE: 1 /HPF

## 2023-05-19 ENCOUNTER — NON-APPOINTMENT (OUTPATIENT)
Age: 58
End: 2023-05-19

## 2023-05-22 ENCOUNTER — APPOINTMENT (OUTPATIENT)
Dept: OBGYN | Facility: CLINIC | Age: 58
End: 2023-05-22
Payer: COMMERCIAL

## 2023-05-22 VITALS
WEIGHT: 155 LBS | SYSTOLIC BLOOD PRESSURE: 122 MMHG | DIASTOLIC BLOOD PRESSURE: 76 MMHG | BODY MASS INDEX: 24.91 KG/M2 | HEIGHT: 66 IN

## 2023-05-22 DIAGNOSIS — Z71.2 PERSON CONSULTING FOR EXPLANATION OF EXAMINATION OR TEST FINDINGS: ICD-10-CM

## 2023-05-22 DIAGNOSIS — N95.1 MENOPAUSAL AND FEMALE CLIMACTERIC STATES: ICD-10-CM

## 2023-05-22 PROCEDURE — 99214 OFFICE O/P EST MOD 30 MIN: CPT

## 2023-05-22 NOTE — DISCUSSION/SUMMARY
[FreeTextEntry1] : 1.  I reviewed the imaging of the ultrasound myself.  I agree with the assessment.  Endometrium is overall thin.  On my measurements about 4.5 mm.  No other abnormal findings.  I discussed with her that given that the endometrium appears homogeneous and thin, we can continue monitoring menstruation.  I discussed with her that if she does not menstruate until the end of the summer, she is considered menopausal.  Discussed with her that if she bleeds before this, and it is either very heavy or prolonged she should come in for an assessment.\par 2.  I discussed with her the vasomotor symptoms that she is experiencing.\par I discussed with her management options including:\par - Environmental changes such as light clothing, a fan at the bedside/hand-held fan in her bag, sleeping with the windows open, sleeping with an ice pack under the pillow, monitoring her diet and seeing if anything that she consumes increases or decreases the severity and the prevalence of hot flashes.\par - Discussed nonhormonal medical options.  Discussed that the first-line is SSRI management.  Discussed with her that 70% of women will have improvement in hot flashes, but that SSRIs do not offer bone protection.\par -Hormone therapy.  Discussed with her combination hormones as well as progesterone only at bedtime.\par Discussed with her the benefits and disadvantages of hormone therapy including elevation of hot flashes, productivity towards the bone, positive effect on cardiac and brain health.  Discussed the small increased risk in breast cancer, as well as the risk for exacerbating cardiac disease if one is present.\par Discussed with her that given her HTN, that is currently medically managed, I would recommend getting a lipid profile and assessing her long-term cardiac risk.  Referral was given and decision was made to follow-up after results are in to make a decision on whether she is interested in hormone therapy.\par All her questions were answered.\par Follow-up with results

## 2023-05-22 NOTE — HISTORY OF PRESENT ILLNESS
[FreeTextEntry1] : Yessenia came in for menopausal consult. \par She reports severe hot flushes including night sweats, difficulty sleeping.\par She reports the hot flushes are very severe and accompanied with sweating and redness, and she is using environmental methods including light clothing, cold drinks, staying in areas that are cool.\par She reports during the night she sweats severely and the bed us wet. \par Denies vaginal symptoms. \par \par She had an ultrasound repeat done 2 weeks ago.\par Ultrasound shows a 7.2 x 5.3 x 3.7 cm uterus, heterogeneous.  Endometrium 4 mm.  Bilateral ovaries normal.  LMP last summer.  She has not had a full year without menstruation yet.

## 2023-09-21 ENCOUNTER — APPOINTMENT (OUTPATIENT)
Dept: OBGYN | Facility: CLINIC | Age: 58
End: 2023-09-21
Payer: COMMERCIAL

## 2023-09-21 VITALS
SYSTOLIC BLOOD PRESSURE: 130 MMHG | BODY MASS INDEX: 24.33 KG/M2 | WEIGHT: 155 LBS | DIASTOLIC BLOOD PRESSURE: 76 MMHG | HEIGHT: 67 IN

## 2023-09-21 DIAGNOSIS — I10 ESSENTIAL (PRIMARY) HYPERTENSION: ICD-10-CM

## 2023-09-21 PROCEDURE — 99213 OFFICE O/P EST LOW 20 MIN: CPT

## 2023-09-26 ENCOUNTER — ASOB RESULT (OUTPATIENT)
Age: 58
End: 2023-09-26

## 2023-09-26 ENCOUNTER — APPOINTMENT (OUTPATIENT)
Dept: OBGYN | Facility: CLINIC | Age: 58
End: 2023-09-26
Payer: COMMERCIAL

## 2023-09-26 ENCOUNTER — APPOINTMENT (OUTPATIENT)
Dept: ANTEPARTUM | Facility: CLINIC | Age: 58
End: 2023-09-26
Payer: COMMERCIAL

## 2023-09-26 VITALS
SYSTOLIC BLOOD PRESSURE: 160 MMHG | WEIGHT: 159 LBS | HEIGHT: 67 IN | BODY MASS INDEX: 24.96 KG/M2 | DIASTOLIC BLOOD PRESSURE: 88 MMHG

## 2023-09-26 DIAGNOSIS — N76.0 ACUTE VAGINITIS: ICD-10-CM

## 2023-09-26 DIAGNOSIS — R10.2 PELVIC AND PERINEAL PAIN: ICD-10-CM

## 2023-09-26 LAB
BACTERIA UR CULT: NORMAL
C TRACH RRNA SPEC QL NAA+PROBE: NOT DETECTED
CANDIDA VAG CYTO: NOT DETECTED
G VAGINALIS+PREV SP MTYP VAG QL MICRO: DETECTED
N GONORRHOEA RRNA SPEC QL NAA+PROBE: NOT DETECTED
SOURCE AMPLIFICATION: NORMAL
T VAGINALIS VAG QL WET PREP: NOT DETECTED

## 2023-09-26 PROCEDURE — 76856 US EXAM PELVIC COMPLETE: CPT | Mod: 59

## 2023-09-26 PROCEDURE — 99213 OFFICE O/P EST LOW 20 MIN: CPT

## 2023-09-26 PROCEDURE — 76830 TRANSVAGINAL US NON-OB: CPT

## 2023-09-26 RX ORDER — METRONIDAZOLE 500 MG/1
500 TABLET ORAL TWICE DAILY
Qty: 14 | Refills: 0 | Status: ACTIVE | COMMUNITY
Start: 2023-09-26 | End: 1900-01-01